# Patient Record
Sex: FEMALE
[De-identification: names, ages, dates, MRNs, and addresses within clinical notes are randomized per-mention and may not be internally consistent; named-entity substitution may affect disease eponyms.]

---

## 2018-12-04 ENCOUNTER — HOSPITAL ENCOUNTER (EMERGENCY)
Dept: HOSPITAL 31 - C.ER | Age: 53
Discharge: HOME | End: 2018-12-04
Payer: MEDICAID

## 2018-12-04 VITALS
RESPIRATION RATE: 20 BRPM | HEART RATE: 77 BPM | OXYGEN SATURATION: 98 % | DIASTOLIC BLOOD PRESSURE: 71 MMHG | SYSTOLIC BLOOD PRESSURE: 111 MMHG

## 2018-12-04 VITALS — BODY MASS INDEX: 27.4 KG/M2

## 2018-12-04 VITALS — TEMPERATURE: 98 F

## 2018-12-04 DIAGNOSIS — M32.9: ICD-10-CM

## 2018-12-04 DIAGNOSIS — E78.00: ICD-10-CM

## 2018-12-04 DIAGNOSIS — M06.9: ICD-10-CM

## 2018-12-04 DIAGNOSIS — R42: Primary | ICD-10-CM

## 2018-12-04 DIAGNOSIS — J45.909: ICD-10-CM

## 2018-12-04 LAB
ALBUMIN SERPL-MCNC: 4.2 G/DL (ref 3.5–5)
ALBUMIN/GLOB SERPL: 1.5 {RATIO} (ref 1–2.1)
ALT SERPL-CCNC: 24 U/L (ref 9–52)
AST SERPL-CCNC: 20 U/L (ref 14–36)
BASOPHILS # BLD AUTO: 0.1 K/UL (ref 0–0.2)
BASOPHILS NFR BLD: 1.8 % (ref 0–2)
BILIRUB UR-MCNC: NEGATIVE MG/DL
BUN SERPL-MCNC: 13 MG/DL (ref 7–17)
CALCIUM SERPL-MCNC: 9 MG/DL (ref 8.6–10.4)
EOSINOPHIL # BLD AUTO: 0.2 K/UL (ref 0–0.7)
EOSINOPHIL NFR BLD: 2.8 % (ref 0–4)
ERYTHROCYTE [DISTWIDTH] IN BLOOD BY AUTOMATED COUNT: 16.7 % (ref 11.5–14.5)
GFR NON-AFRICAN AMERICAN: > 60
GLUCOSE UR STRIP-MCNC: NORMAL MG/DL
HCG,QUALITATIVE URINE: NEGATIVE
HGB BLD-MCNC: 12 G/DL (ref 11–16)
LEUKOCYTE ESTERASE UR-ACNC: (no result) LEU/UL
LYMPHOCYTES # BLD AUTO: 1.8 K/UL (ref 1–4.3)
LYMPHOCYTES NFR BLD AUTO: 31.8 % (ref 20–40)
MCH RBC QN AUTO: 26.1 PG (ref 27–31)
MCHC RBC AUTO-ENTMCNC: 33.9 G/DL (ref 33–37)
MCV RBC AUTO: 77 FL (ref 81–99)
MONOCYTES # BLD: 0.3 K/UL (ref 0–0.8)
MONOCYTES NFR BLD: 5 % (ref 0–10)
NEUTROPHILS # BLD: 3.3 K/UL (ref 1.8–7)
NEUTROPHILS NFR BLD AUTO: 58.6 % (ref 50–75)
NRBC BLD AUTO-RTO: 0 % (ref 0–2)
PH UR STRIP: 7 [PH] (ref 5–8)
PLATELET # BLD: 301 K/UL (ref 130–400)
PMV BLD AUTO: 8.4 FL (ref 7.2–11.7)
PROT UR STRIP-MCNC: NEGATIVE MG/DL
RBC # BLD AUTO: 4.6 MIL/UL (ref 3.8–5.2)
RBC # UR STRIP: NEGATIVE /UL
SP GR UR STRIP: 1.01 (ref 1–1.03)
SQUAMOUS EPITHIAL: < 1 /HPF (ref 0–5)
UROBILINOGEN UR-MCNC: NORMAL MG/DL (ref 0.2–1)
WBC # BLD AUTO: 5.6 K/UL (ref 4.8–10.8)

## 2018-12-04 PROCEDURE — 84703 CHORIONIC GONADOTROPIN ASSAY: CPT

## 2018-12-04 PROCEDURE — 80053 COMPREHEN METABOLIC PANEL: CPT

## 2018-12-04 PROCEDURE — 96361 HYDRATE IV INFUSION ADD-ON: CPT

## 2018-12-04 PROCEDURE — 70450 CT HEAD/BRAIN W/O DYE: CPT

## 2018-12-04 PROCEDURE — 96375 TX/PRO/DX INJ NEW DRUG ADDON: CPT

## 2018-12-04 PROCEDURE — 96365 THER/PROPH/DIAG IV INF INIT: CPT

## 2018-12-04 PROCEDURE — 99285 EMERGENCY DEPT VISIT HI MDM: CPT

## 2018-12-04 PROCEDURE — 81001 URINALYSIS AUTO W/SCOPE: CPT

## 2018-12-04 PROCEDURE — 85025 COMPLETE CBC W/AUTO DIFF WBC: CPT

## 2018-12-04 NOTE — C.PDOC
History Of Present Illness





53 year old female with a history of lupus, rheumatoid arthritis, and unknown 

"headaches" presents to the ED for frontal headache associated with nausea and 

dizziness for 3-4 days. The patient describes the headache as throbbing and 

notes the pain radiates to the back of the neck. She states she does not 

typically experience dizziness with her headaches. Denies fever, chills, cough, 

nausea, vomiting, vision changes, numbness, weakness, and any other associated 

symptoms. 





Time Seen by Provider: 12/04/18 12:42


Chief Complaint (Nursing): Dizziness/Lightheaded


History Per: Patient


History/Exam Limitations: no limitations


Onset/Duration Of Symptoms: Days


Current Symptoms Are (Timing): Still Present


Recent travel outside of the [place]: No





Past Medical History


Reviewed: Historical Data, Nursing Documentation, Vital Signs


Vital Signs: 





                                Last Vital Signs











Temp  98 F   12/04/18 12:29


 


Pulse  70   12/04/18 12:29


 


Resp  18   12/04/18 12:29


 


BP  122/84   12/04/18 12:29


 


Pulse Ox  100   12/04/18 12:29














- Medical History


PMH: Arthritis, Asthma, Hypercholesterolemia, Rheumatoid Arthritis


   Denies: Chronic Kidney Disease


Family History: States: Unknown Family Hx, CAD (Brother)





- Social History


Hx Tobacco Use: No


Hx Alcohol Use: No


Hx Substance Use: No





- Immunization History


Hx Tetanus Toxoid Vaccination: No


Hx Influenza Vaccination: No


Hx Pneumococcal Vaccination: No





Review Of Systems


Constitutional: Negative for: Fever, Chills


Respiratory: Negative for: Cough


Gastrointestinal: Positive for: Nausea.  Negative for: Vomiting


Neurological: Positive for: Headache (frontal.), Dizziness.  Negative for: 

Weakness, Numbness





Physical Exam





- Physical Exam


Appears: Non-toxic, No Acute Distress, Chronically Ill


Skin: Warm, Dry, No Rash


Head: Atraumatic, Normacephalic


Eye(s): bilateral: Normal Inspection


Ear(s): Bilateral: Normal


Oral Mucosa: Moist


Throat: No Erythema, No Exudate


Neck: Normal ROM, Supple


Chest: Symmetrical, No Deformity


Cardiovascular: Rhythm Regular, No Friction Rub, No Murmur


Respiratory: Normal Breath Sounds, No Rales, No Rhonchi, No Wheezing


Gastrointestinal/Abdominal: Normal Exam, Soft, No Tenderness


Back: Normal Inspection, No CVA Tenderness


Extremity: Normal ROM (x4), No Swelling


Neurological/Psych: Oriented x3, Normal Speech, Normal Cognition, Normal Motor, 

Normal Sensation, Normal Reflexes


Gait: Steady





ED Course And Treatment





- Laboratory Results


Result Diagrams: 


                                 12/04/18 14:58





                                 12/04/18 14:58


O2 Sat by Pulse Oximetry: 100 (RA)


Pulse Ox Interpretation: Normal





- CT Scan/US


  ** CT Head


Other Rad Studies (CT/US): Read By Radiologist


CT/US Interpretation: FINDINGS:  HEMORRHAGE:  No intracranial hemorrhage.  

BRAIN:  No mass effect or edema.  The gray-white matter differentiation appears 

intact. Please note that MRI with diffusion imaging is more sensitive in the 

detection of acute ischemic event.  VENTRICLES:  No hydrocephalus.  CALVARIUM:  

Unremarkable.  PARANASAL SINUSES:  Unremarkable as visualized. No significant 

inflammatory changes.  MASTOID AIR CELLS:  Unremarkable as visualized. No 

inflammatory changes.  OTHER FINDINGS:  None.  IMPRESSION:  No acute 

intracranial pathology identified.





Medical Decision Making


Medical Decision Making: 





Plan: 


--CT Head w/o contrast. 


--Blood sent. 


--HCG Urine. 


--Urinalysis. 


--Benadryl. 


--Reglan. 





On re-exam, the patient report improvement of symptoms. Lungs are CTA, heart is 

RRR, abdomen is soft, non-tender and tolerating PO well. Ambulatory in the ED 

with steady gait. Follow up with the medical doctor within 1-2 days. Return if 

worsened. 





Disposition





- Disposition


Referrals: 


Dev Tarango MD [Staff Provider] - 


Disposition: HOME/ ROUTINE


Disposition Time: 16:26


Condition: STABLE


Additional Instructions: 


Follow up with the medical doctor within 1-2 days. Return if worsened. 





Prescriptions: 


Meclizine HCl 25 mg PO TID PRN #25 tablet


 PRN Reason: Dizziness


Instructions:  Vertigo (a Type of Dizziness) (DC)


Forms:  [facility] (English)


Print Language: Telugu





- Clinical Impression


Clinical Impression: 


 Vertigo








- PA / NP / Resident Statement


MD/DO has reviewed & agrees with the documentation as recorded.





- Scribe Statement


The provider has reviewed the documentation as recorded by the Scribe (Danika Vincent)





All medical record entries made by the Scribe were at my direction and 

personally dictated by me. I have reviewed the chart and agree that the record 

accurately reflects my personal performance of the history, physical exam, medi

vicky decision making, and the department course for this patient. I have also 

personally directed, reviewed, and agree with the discharge instructions and 

disposition.

## 2018-12-04 NOTE — CT
Date of service: 12/04/2018



PROCEDURE:  CT HEAD WITHOUT CONTRAST.



HISTORY:

headache, dizziness, r/o bleed



COMPARISON:

None available



TECHNIQUE:

Axial computed tomography images were obtained through the head/brain 

without intravenous contrast.  



Radiation dose:



Total exam DLP = 897.49 mGy-cm.



This CT exam was performed using one or more of the following dose 

reduction techniques: Automated exposure control, adjustment of the 

mA and/or kV according to patient size, and/or use of iterative 

reconstruction technique.



FINDINGS:



HEMORRHAGE:

No intracranial hemorrhage. 



BRAIN:

No mass effect or edema.  The gray-white matter differentiation 

appears intact. Please note that MRI with diffusion imaging is more 

sensitive in the detection of acute ischemic event.



VENTRICLES:

No hydrocephalus. 



CALVARIUM:

Unremarkable.



PARANASAL SINUSES:

Unremarkable as visualized. No significant inflammatory changes.



MASTOID AIR CELLS:

Unremarkable as visualized. No inflammatory changes.



OTHER FINDINGS:

None.



IMPRESSION:

No acute intracranial pathology identified.

## 2019-04-08 NOTE — C.PDOC
History Of Present Illness


54 year old female presents to ED with complaint of productive cough with yellow

sputum for the past 2 days. Patient states that she also lost her voice, has 

generalized body aches, and a headache. Patient also complains of left sided 

chest pain that radiates to her left scapula. Patient denies that the pain is 

related to her cough and deep breathes. Patient denies SOB, palpitations, fever,

runny nose, sore throat, and nasal congestion. 








<Sofia Leavitt - Last Filed: 04/08/19 23:08>


History Per: Patient


History/Exam Limitations: no limitations


Onset/Duration Of Symptoms: Days (2)


Current Symptoms Are (Timing): Still Present


Location Of Pain: Diffuse Myalgias, Headache


Associated Symptoms: Cough, Sputum.  denies: Fever, Chills, Sore Throat, Sinus 

Drainage, Nasal Congestion





<Sofia Leavitt - Last Filed: 04/08/19 23:08>





<Anika Stoddard - Last Filed: 04/09/19 01:45>


Time Seen by Provider: 04/08/19 16:53


Chief Complaint (Nursing): Cough, Cold, Congestion





Past Medical History


Reviewed: Historical Data, Nursing Documentation, Vital Signs


Vital Signs: 





                                Last Vital Signs











Temp  98.1 F   04/08/19 16:45


 


Pulse  84   04/08/19 16:45


 


Resp  14   04/08/19 16:45


 


BP  133/86   04/08/19 16:45


 


Pulse Ox  100   04/08/19 16:45














- Medical History


PMH: Arthritis, Asthma, Hypercholesterolemia, Rheumatoid Arthritis


   Denies: Chronic Kidney Disease


Surgical History: No Surg Hx


Family History: States: Unknown Family Hx, CAD (Brother)





- Social History


Hx Tobacco Use: No


Hx Alcohol Use: No


Hx Substance Use: No





- Immunization History


Hx Tetanus Toxoid Vaccination: No


Hx Influenza Vaccination: No


Hx Pneumococcal Vaccination: No





<Sofia Leavitt - Last Filed: 04/08/19 23:08>


Vital Signs: 





                                Last Vital Signs











Temp  98.3 F   04/08/19 21:33


 


Pulse  85   04/08/19 21:33


 


Resp  18   04/08/19 21:33


 


BP  116/75   04/08/19 21:33


 


Pulse Ox  100   04/08/19 23:14














<Anika Stoddard - Last Filed: 04/09/19 01:45>





Review Of Systems


Constitutional: Positive for: Weakness.  Negative for: Fever, Chills


ENT: Negative for: Nose Discharge, Nose Congestion, Throat Pain


Cardiovascular: Positive for: Chest Pain (left sided pain that radiates into the

left scapula).  Negative for: Palpitations


Respiratory: Positive for: Cough, Sputum (yellow in nature).  Negative for: 

Shortness of Breath


Neurological: Positive for: Headache.  Negative for: Weakness, Numbness, 

Dizziness





<Sofia Leavitt - Last Filed: 04/08/19 23:08>





Physical Exam





- Physical Exam


Appears: Well, Non-toxic, No Acute Distress


Skin: Normal Color, Warm, Dry


Head: Atraumatic, Normacephalic


Throat: Normal, No Erythema, No Exudate


Neck: Normal ROM, Supple


Chest: Symmetrical, No Deformity, No Tenderness


Cardiovascular: Rhythm Regular, No Murmur


Respiratory: No Accessory Muscle Use, No Rales, No Rhonchi, No Wheezing


Gastrointestinal/Abdominal: Soft, No Tenderness


Extremity: Capillary Refill (<2 seconds)


Neurological/Psych: Oriented x3, Normal Speech, Normal Cognition





<Sofia Leavitt - Last Filed: 04/08/19 23:08>





ED Course And Treatment





- Laboratory Results


Result Diagrams: 


                                 04/08/19 17:33





                                 04/08/19 17:33


Lab Results: 





                                        











PT  11.9 SECONDS (9.7-12.2)   04/08/19  17:33    


 


INR  1.1   04/08/19  17:33    


 


APTT  36 SECONDS (21-34)  H  04/08/19  17:33    








                                        











Troponin I  < 0.0120 ng/mL (0.00-0.120)   04/08/19  17:33    








                                        











Total Bilirubin  0.5 mg/dL (0.2-1.3)   04/08/19  17:33    


 


AST  34 U/L (14-36)   04/08/19  17:33    


 


ALT  9 U/L (9-52)  D 04/08/19  17:33    


 


Alkaline Phosphatase  75 U/L ()   04/08/19  17:33    


 


Total Protein  7.2 g/dL (6.3-8.3)   04/08/19  17:33    


 


Albumin  4.4 g/dL (3.5-5.0)   04/08/19  17:33    


 


Globulin  2.8 gm/dL (2.2-3.9)   04/08/19  17:33    


 


Albumin/Globulin Ratio  1.6  (1.0-2.1)   04/08/19  17:33    








                                        











Urine Color  Straw  (YELLOW)   04/08/19  17:33    


 


Urine Clarity  Clear  (Clear)   04/08/19  17:33    


 


Urine pH  5.0  (5.0-8.0)   04/08/19  17:33    


 


Ur Specific Gravity  1.009  (1.003-1.030)   04/08/19  17:33    


 


Urine Protein  Negative mg/dL (NEGATIVE)   04/08/19  17:33    


 


Urine Glucose (UA)  Normal mg/dL (Normal)   04/08/19  17:33    


 


Urine Ketones  Negative mg/dL (NEGATIVE)   04/08/19  17:33    


 


Urine Blood  Negative  (NEGATIVE)   04/08/19  17:33    


 


Urine Nitrate  Negative  (NEGATIVE)   04/08/19  17:33    


 


Urine Bilirubin  Negative  (NEGATIVE)   04/08/19  17:33    


 


Urine Urobilinogen  Normal mg/dL (0.2-1.0)   04/08/19  17:33    


 


Ur Leukocyte Esterase  Neg Evangelina/uL (Negative)   04/08/19  17:33    


 


Urine WBC (Auto)  1 /hpf (0-5)   04/08/19  17:33    


 


Urine RBC (Auto)  < 1 /hpf (0-3)   04/08/19  17:33    


 


Urine Bacteria  Rare  (<OCC)   04/08/19  17:33    











O2 Sat by Pulse Oximetry: 100 (in RA)





- Other Rad


  ** CXR


X-Ray: Interpreted by Me, Viewed By Me


Interpretation: Accession No. : [id number].  Patient Name / ID : 

JAMAL ZHANG  / [id number].  Exam Date : 04/08/2019 17:10:51 ( 

Approved ).  Study Comment :  Sex / Age : F  / 54Y.  Creator : Jose Tejada MD.  Dictator : Jose Tejada MD.   :  Approver : Jose Tejada MD.  Approver2 :  Report Date : 04/08/2019 17:53:32.  My Comment

:  ****************************

*******************************************************.  Date of service:  

04/08/2019.  HISTORY:  pain, cough.  COMPARISON:  10/05/2016.  TECHNIQUE:  Chest

PA and lateral views.  FINDINGS:  LUNGS:  No active pulmonary disease.  PLEURA: 

No significant pleural effusion identified. No pneumothorax apparent.  

CARDIOVASCULAR:  No aortic atherosclerotic calcification present.  Normal 

cardiac size. No pulmonary vascular congestion.  OSSEOUS STRUCTURES:  No 

significant abnormalities.  VISUALIZED UPPER ABDOMEN:  Normal.  OTHER FINDINGS: 

None.  IMPRESSION:  No active disease. No significant interval change compared 

to the prior examination(s).


Progress Note: Labs ordered with cardiac enzymes.  CTA chest, CXR, and EKG 

ordered for patient.  Patient given Albuterol INH, solu-medrol, and IV fluids. 

Case was d/c Hospitalist who sts hospitalization and observation is not javier

cated and symptoms are most likely due to constant cough and URI. The second 

troponin ordered and negative, CTA chest ordered.





- Physician Consult Information


Time Consulting Physician Contacted: 19:15


Physician Contacted: Neftali Claros


Outcome Of Conversation: Doesn't want to accept patient for observation, stating

CP most likely is due to URI and constant cough.





<Sofia Leavitt - Last Filed: 04/08/19 23:08>





- Laboratory Results


Result Diagrams: 


                                 04/08/19 17:33





                                 04/08/19 17:33


Lab Results: 





                                        











PT  11.9 SECONDS (9.7-12.2)   04/08/19  17:33    


 


INR  1.1   04/08/19  17:33    


 


APTT  36 SECONDS (21-34)  H  04/08/19  17:33    








                                        











Troponin I  < 0.0120 ng/mL (0.00-0.120)   04/08/19  22:01    








                                        











Total Bilirubin  0.5 mg/dL (0.2-1.3)   04/08/19  17:33    


 


AST  34 U/L (14-36)   04/08/19  17:33    


 


ALT  9 U/L (9-52)  D 04/08/19  17:33    


 


Alkaline Phosphatase  75 U/L ()   04/08/19  17:33    


 


Total Protein  7.2 g/dL (6.3-8.3)   04/08/19  17:33    


 


Albumin  4.4 g/dL (3.5-5.0)   04/08/19  17:33    


 


Globulin  2.8 gm/dL (2.2-3.9)   04/08/19  17:33    


 


Albumin/Globulin Ratio  1.6  (1.0-2.1)   04/08/19  17:33    








                                        











Urine Color  Straw  (YELLOW)   04/08/19  17:33    


 


Urine Clarity  Clear  (Clear)   04/08/19  17:33    


 


Urine pH  5.0  (5.0-8.0)   04/08/19  17:33    


 


Ur Specific Gravity  1.009  (1.003-1.030)   04/08/19  17:33    


 


Urine Protein  Negative mg/dL (NEGATIVE)   04/08/19  17:33    


 


Urine Glucose (UA)  Normal mg/dL (Normal)   04/08/19  17:33    


 


Urine Ketones  Negative mg/dL (NEGATIVE)   04/08/19  17:33    


 


Urine Blood  Negative  (NEGATIVE)   04/08/19  17:33    


 


Urine Nitrate  Negative  (NEGATIVE)   04/08/19  17:33    


 


Urine Bilirubin  Negative  (NEGATIVE)   04/08/19  17:33    


 


Urine Urobilinogen  Normal mg/dL (0.2-1.0)   04/08/19  17:33    


 


Ur Leukocyte Esterase  Neg Evangelina/uL (Negative)   04/08/19  17:33    


 


Urine WBC (Auto)  1 /hpf (0-5)   04/08/19  17:33    


 


Urine RBC (Auto)  < 1 /hpf (0-3)   04/08/19  17:33    


 


Urine Bacteria  Rare  (<OCC)   04/08/19  17:33    














- CT Scan/US


  ** CTA


Other Rad Studies (CT/US): Read By Radiologist, Radiology Report Reviewed


CT/US Interpretation: CTA OF THE CHEST WITH IV CONTRAST.  CLINICAL HISTORY: 

Chest pain.  TECHNIQUE: Axial and reformatted sagittal and coronal images of the

chest obtained after bolus IV contrast administration.  FINDINGS:  Normal 

enhancement of the main pulmonary artery and right and left pulmonary arteries. 

Normal enhancement of the bilateral peripheral pulmonary arteries.  There is no 

demonstrated pulmonary embolism.  Normal thoracic aorta and visualized great 

vessels.  There is no demonstrated aortic dissection.  Normal heart and 

pericardium.  Normal mediastinum.  Normal hilar regions.  Normal visualized 

trachea and bronchi.  The lungs are well expanded.  Normal pulmonary parenchyma.

 Normal pleura.  Normal chest wall structures.  Normal osseous structures.  

Normal visualized upper abdomen.  IMPRESSION:  Normal CTA chest examination, 

without a demonstrated pulmonary embolism or arterial dissection.





<Anika Stoddard - Last Filed: 04/09/19 01:45>





Medical Decision Making


Medical Decision Making: 





Patient signed out to me at shift change pending CTA. Study complete, results as

follows-





IMPRESSION:


Normal CTA chest examination, without a demonstrated pulmonary embolism or 

arterial dissection.





Result discussed with patient. Discharged with Rx for zpack and inhaler for 

acute bronchitis.





<Anika Stoddard - Last Filed: 04/09/19 01:45>





Disposition





- Disposition


Disposition Time: 23:09





<Sofia Leavitt - Last Filed: 04/08/19 23:08>





<Anika Stoddard - Last Filed: 04/09/19 01:45>





- Disposition


Disposition: HOME/ ROUTINE


Condition: STABLE


Additional Instructions: 





LEATHA BERRY, thank you for letting us take care of you today. 

Your provider was Anika Stoddard MD and you were treated for COLD/THROAT 

PAIN. The emergency medical care you received today was directed at your acute 

symptoms. If you were prescribed any medication, please fill it and take as 

directed. It may take several days for your symptoms to resolve. Return to the 

Emergency Department if your symptoms worsen, do not improve, or if you have any

other problems.





Please contact your doctor or call one of the physicians/clinics you have been 

referred to that are listed on the Patient Visit Information form that is 

included in your discharge packet. Bring any paperwork you were given at 

discharge with you along with any medications you are taking to your follow up 

visit. Our treatment cannot replace ongoing medical care by a primary care 

provider outside of the emergency department.





Thank you for allowing the [facility] team to be part of your care today.








If you had an X-Ray or CT scan: A Radiologist will review the ED reading if any 

change in treatment is needed we will contact you.***





If you had a blood, urine, or wound culture: It will take several days for the 

results, if any change in treatment is needed we will contact you.***





If you had an STI test: It will take 48 hours for the results. Please call after

1 week if you have not heard back.***


Prescriptions: 


Albuterol HFA [Ventolin HFA 90 mcg/actuation (8 g)] 2 puff IH Q4H PRN #1 inh


 PRN Reason: Wheezing


Azithromycin [Zithromax] 250 mg PO DAILY #6 tab


Instructions:  Acute Bronchitis, Adult (DC)


Forms:  [facility] (English), Work Excuse





- Clinical Impression


Clinical Impression: 


 Bronchitis, Laryngitis, Chest pain








- PA / NP / Resident Statement


MD/DO has reviewed & agrees with the documentation as recorded. (Lizet Grover)





- Scribe Statement


The provider has reviewed the documentation as recorded by the Scribe (Lizet Grover)








All medical record entries made by the Scribe were at my direction and 

personally dictated by me. I have reviewed the chart and agree that the record 

accurately reflects my personal performance of the history, physical exam, 

medical decision making, and the department course for this patient. I have also

 personally directed, reviewed, and agree with the discharge instructions and 

disposition.





<Sofia Leavitt - Last Filed: 04/08/19 23:08>





Physician Patient Turnover


Patient Signed Over To: Anika Stoddard


Handoff Comments: pending Chest CTA, if negative d/c on Z-pack, Prednisone and 

Ventolin





<Sofia Leavitt - Last Filed: 04/08/19 23:08>

## 2019-04-08 NOTE — RAD
Date of service: 



04/08/2019



HISTORY:

 pain, cough 



COMPARISON:

10/05/2016



TECHNIQUE:

Chest PA and lateral views



FINDINGS:



LUNGS:

No active pulmonary disease.



PLEURA:

No significant pleural effusion identified. No pneumothorax apparent.



CARDIOVASCULAR:

No aortic atherosclerotic calcification present.



Normal cardiac size. No pulmonary vascular congestion. 



OSSEOUS STRUCTURES:

No significant abnormalities.



VISUALIZED UPPER ABDOMEN:

Normal.



OTHER FINDINGS:

None.



IMPRESSION:

No active disease. No significant interval change compared to the 

prior examination(s).

## 2019-04-09 NOTE — CT
Date of service: 04/08/2019



CTA chest PE protocol 



Indication: chest pain



Technique: 



Contiguous axial images were obtained through the chest with 

intravenous contrast enhancement. Sagittal and coronal 

reconstructions were generated and reviewed. 



This CT exam was performed using 1 or more of the following dose 

reduction techniques: Automated exposure control, adjustment of the 

MAA and/or kV according to patient size, and/or use of iterative 

reconstruction technique. 



IV contrast: 100 mL Visipaque 320 IV







Radiation dose (DLP): 377.9 MGy-cm. 



Comparison: Chest x-ray performed 4/8/19



Findings: 



Visualized portions of the inferior thyroid gland appear 

unremarkable. The mediastinal and hilar vascular structures appear 

within normal limits.  The heart appears within normal limits of 

size. 



No large central or segmental pulmonary embolus evident.



No focal consolidation. No pleural effusion. No pneumothorax. 3 mm 

right upper lobe nodule (series 4, image 31). 4 mm right middle lobe 

nodule (image 50). 



Limited visualized portions of the upper abdomen: 17 mm hypodensity 

in the posterior right hepatic lobe (series 3, image 78). 



No acute osseous abnormality is detected. 



Impression: 



No large central or segmental pulmonary embolus evident.



No focal consolidation. No pleural effusion. No pneumothorax. 



3 mm right upper lobe nodule. 4 mm right middle lobe nodule. 

According to 2017 Fleischner criteria, if the patient is low risk, no 

routine follow-up is recommended. If the patient is high risk, an 

optional CT at 12 months is recommended. 



17 mm hypodensity in the posterior right hepatic lobe; abdominal 

ultrasound and or dedicated cross-sectional imaging may be considered 

for further characterization.



Preliminary impression was provided by [facility].  Study marked for PA 

review.